# Patient Record
Sex: MALE | Race: WHITE | Employment: FULL TIME | URBAN - METROPOLITAN AREA
[De-identification: names, ages, dates, MRNs, and addresses within clinical notes are randomized per-mention and may not be internally consistent; named-entity substitution may affect disease eponyms.]

---

## 2020-08-07 ENCOUNTER — HOSPITAL ENCOUNTER (OUTPATIENT)
Dept: MRI IMAGING | Age: 55
Discharge: HOME | End: 2020-08-09
Payer: COMMERCIAL

## 2020-08-07 PROCEDURE — 73221 MRI JOINT UPR EXTREM W/O DYE: CPT

## 2020-12-31 ENCOUNTER — HOSPITAL ENCOUNTER (OUTPATIENT)
Dept: MRI IMAGING | Age: 55
Discharge: HOME | End: 2021-01-02
Payer: COMMERCIAL

## 2020-12-31 DIAGNOSIS — M25.511 ACUTE PAIN OF RIGHT SHOULDER: ICD-10-CM

## 2020-12-31 PROCEDURE — 73221 MRI JOINT UPR EXTREM W/O DYE: CPT

## 2024-06-05 ENCOUNTER — OFFICE VISIT (OUTPATIENT)
Age: 59
End: 2024-06-05
Payer: COMMERCIAL

## 2024-06-05 DIAGNOSIS — R10.84 GENERALIZED ABDOMINAL PAIN: Primary | ICD-10-CM

## 2024-06-05 DIAGNOSIS — M62.838 MUSCLE SPASM: ICD-10-CM

## 2024-06-05 DIAGNOSIS — R35.0 FREQUENCY OF URINATION: ICD-10-CM

## 2024-06-05 PROCEDURE — 97161 PT EVAL LOW COMPLEX 20 MIN: CPT | Performed by: PHYSICAL THERAPIST

## 2024-06-05 PROCEDURE — 97140 MANUAL THERAPY 1/> REGIONS: CPT | Performed by: PHYSICAL THERAPIST

## 2024-06-05 NOTE — PROGRESS NOTES
Physical Therapy Evaluation  [facility], [facility] UROGYNECOLOGY AND PELVIC REHABILITATION   [address] [place] [address]  [address] [place] [address]  Dept: [phone]     Patient:  Swapnil De La Garza  : 1965    Visit Date:  2024   Referring Provider:  MD Dr. Harsh Kramer is available in clinic as incident to provider.    Time In: 08:54  Time Out: 09:24   Total time: 30 min  Total timed codes treatment: 30 min  Total 1:1 time: 30 min    Treatment:  Treatment Charges Mins Units   [x] Manual Therapy (42837) 15 1   [] Therapeutic Activity (69842)     [] Self Care/Home Management Training (23500)     [] Therapeutic Exercise (20345)     [] Neuromuscular Jevon (60242)     [] Gait Training (36339)     [x] PT-Eval (62268, 05449, 58736) 15 1   [] PT Re-Eval (44119)     [] Caregiver Training (29551)     Total Treatment Time: 30 2      Diagnoses:    Diagnosis Orders   1. Generalized abdominal pain        2. Muscle spasm        3. Frequency of urination             Precautions:  BPH. LBP  Swapnil De La Garza, 59 y.o., male presents today for evaluation.    Patient referred by Kevin Del Rosario MD  with pain/tightness in the B lower abdomen, R>L, frequency of urination, occ burning with urination.  .    Pt's concerns are for increased tension and pain in abdomen at least several times a month that can last 3-4 days at a time. Frequency of urination is every  minutes with feeling that he does not completely empty his bladder at least 1-2 timea day when his symptoms are increased. He feels past medicupping PT sessions and stretching were very beneficial for reducing his symptoms. He also felt it helped with urinary frequency issues and the occ burning he has near tip of penis when urinating. He reports an increase in symptoms due to increased stress at home over the last few months.He is now seeing a counselor to help with stress control- he feels his symptoms flare when

## 2024-06-14 ENCOUNTER — TREATMENT (OUTPATIENT)
Age: 59
End: 2024-06-14

## 2024-06-14 DIAGNOSIS — R39.15 URGENCY OF URINATION: ICD-10-CM

## 2024-06-14 DIAGNOSIS — R10.84 ABDOMINAL PAIN, GENERALIZED: Primary | ICD-10-CM

## 2024-06-14 NOTE — PROGRESS NOTES
[facility] UROGYNECOLOGY AND PELVIC REHABILITATION   [address] [place] [address]  Dept: [phone]     Massage  Date:  2024    Patient Name:  Swapnil De La Garza                                                                 :  1965                                                                                Gender:   male                                                                             MRN: [id number]                                                                                 Visit #:  n/a  Injury Date:  n/a  Evaluation Date: n/a  Onset Date:  n/a    Provider:  Ruth Kaur PTA   Therapist of Record/Supervising: Tigist Mayorga                                                   Referring Provider: No ref. provider found                            Medical Diagnoses:    Diagnosis Orders   1. Abdominal pain, generalized        2. Urgency of urination                                                                                    Patient Status:  Pt is present with increased abdominal pain and burn with urination      Provider Interactions with Patient During Visit:  Self pay massage      treatment  [id number]    24 11:04 AM EDT review of systems completed     Treatment Time:    Time In:11:00am            Time Out: 11:30am    Electronically signed by:  Ruth Kaur PTA

## 2024-06-28 ENCOUNTER — EVALUATION (OUTPATIENT)
Age: 59
End: 2024-06-28
Payer: COMMERCIAL

## 2024-06-28 DIAGNOSIS — R35.0 FREQUENCY OF URINATION: ICD-10-CM

## 2024-06-28 DIAGNOSIS — M62.838 MUSCLE SPASM: ICD-10-CM

## 2024-06-28 DIAGNOSIS — R10.84 GENERALIZED ABDOMINAL PAIN: ICD-10-CM

## 2024-06-28 DIAGNOSIS — R39.15 URGENCY OF URINATION: ICD-10-CM

## 2024-06-28 DIAGNOSIS — R10.84 ABDOMINAL PAIN, GENERALIZED: Primary | ICD-10-CM

## 2024-06-28 PROCEDURE — 97140 MANUAL THERAPY 1/> REGIONS: CPT | Performed by: PHYSICAL THERAPIST

## 2024-06-28 PROCEDURE — 97530 THERAPEUTIC ACTIVITIES: CPT | Performed by: PHYSICAL THERAPIST

## 2024-06-28 NOTE — PROGRESS NOTES
stretching.   Pt to demonstrate correction of urinary voiding dysfunction, as demonstrated by maintenance of relaxed pelvic floor during urination, daily routine     Physical therapy Long Term goals to be achieved in 6  Weeks (6 visit):     Pt to report normalized voiding frequency: daytime intervals of 2-3 hours and 0 time per night for a less bladder pressure during the day and  more restful sleep.           Patient education - patient will demonstrate verbal understanding of normal function of pelvic floor musculature, bowel/bladder habits and appropriate dietary and behavioral modifications.  Patient will be independent in home program for relaxation focused strategies, appropriate stretches and exercises all for purpose of good self management of pain .  Patient to demonstrate low emg baseline prn and decreased feeling of incomplete bladder emptying to correlate with proper relaxed voiding and resolution of pain during evacuation of bladder.  Patient reports decrease of pain by 80- 100% through resolution of pelvic malalignment and instabilities, normalization of spinal mobility/flexibility, resolution of both internal and external muscle spasm and trigger points, and improved motor control and strength of pelvic musculature.   Patient will return to functional ADL's with decreased or no pain, including exercise, relaxation breathing  Physical Therapy Plan of Treatment (may include)     Therapeutic exercise  Neuromotor re-education  Manual therapy  Progressive PME's   Purchase of PF Stimulator or TENS/IF unit for home use  Patient education and home program training  Biofeedback training  medi- cupping/MFR     PLAN- medicupping, hip flex and LB stretch, MFR prn- external pelvic floor/pectinueus        Treatment Charges: Mins Units   []  Modalities     []  Ther Exercise     []  Manual Therapy 30 2   []  Ther Activities 8 1   []  Aquatics     []  Vasocompression     []  Other     Total Treatment time 38 3

## 2024-07-16 ENCOUNTER — EVALUATION (OUTPATIENT)
Age: 59
End: 2024-07-16
Payer: COMMERCIAL

## 2024-07-16 DIAGNOSIS — R39.15 URGENCY OF URINATION: ICD-10-CM

## 2024-07-16 DIAGNOSIS — R10.84 ABDOMINAL PAIN, GENERALIZED: Primary | ICD-10-CM

## 2024-07-16 PROCEDURE — 97140 MANUAL THERAPY 1/> REGIONS: CPT | Performed by: PHYSICAL THERAPIST

## 2024-07-16 PROCEDURE — 97530 THERAPEUTIC ACTIVITIES: CPT | Performed by: PHYSICAL THERAPIST

## 2024-07-16 NOTE — PROGRESS NOTES
[facility], [facility] UROGYNECOLOGY AND PELVIC REHABILITATION   [address] [place] [address]  [address] [place] [address]  Dept: [phone]     Date of Visit: 2024   Patient: Swapnil De La Garza   : 1965   Referring Physician: Kevin Del Rosario MD  Insurance: [facility]    Visit#:  3  Visit Diagnoses:      Diagnosis Orders   1. Abdominal pain, generalized        2. Urgency of urination                   Subjective:  Swapnil De La Garza  (: 1965  is a 59 y.o.  y.o. male ,Established patient.  Pt states he has not had any burning in the the past week some increased abdominal tightness and slight increase in frequency was standing for over 4 hours at a party for his dad.  Pt got a testosterone injection last week.      Objective:   Pt had moderate tightness and tension noted bilateral upper and lower abdomen and UGT    Treatment:  Manual Therapy:  30 MIN , MFR/Cupping to bilateral upper and lower abdomen   There-Act:  10 MIN  Reviewed POC and HEP.  Pt aware of after care and possible side effects of Cupping reviewed bladder irritants     Assessment:  Pt had moderate tightness noted across bilateral upper and lower abdomen decreased after MFR/cupping/  Pt is aware of after care and possible side effects of cupping.  Pt will continue to benefit from skilled PT to increase mobility and decrease pain and frequency    Physical Therapy Short Term Goals to be achieved in 4 weeks:        Pt will demonstrate verbal understanding of good dietary habits including adequate fluid intake, dietary considerations for good bladder and bowel habits  progressing  Pt will be independent with HEP for pain control, relaxation, and therapeutic stretching. progressing  Pt to demonstrate correction of urinary voiding dysfunction, as demonstrated by maintenance of relaxed pelvic floor during urination, daily routine progressing     Physical therapy Long Term goals to be achieved in 6  Weeks (6

## 2024-07-24 NOTE — PROGRESS NOTES
[facility], [facility] UROGYNECOLOGY AND PELVIC REHABILITATION   [address] [place] [address]  [address] [place] [address]  Dept: [phone]     Date of Visit: 2024   Patient: Swapnil De La Garza   : 1965   Referring Physician: Kevin Del Rosario MD  Insurance: [facility]    Visit#:  5  Visit Diagnoses:        Diagnosis Orders   1. Abdominal pain, generalized        2. Urgency of urination        3. Generalized abdominal pain        4. Muscle spasm        5. Frequency of urination                   Subjective:  Swapnil De La Garza  (: 1965  is a 59 y.o.  y.o. male ,Established patient.   Pt states increased burning in his abdomen this past week with tightness/cramping however states he feels stress brings this on he had to take his wife to the hospital again and she was in for 3 days )wife has cirrosis of the liver waiting for appt with [facility] to be on transplant list)       Objective:   Pt had moderate tightness and tension noted bilateral upper and lower abdomen and UGT    Treatment:  Manual Therapy:  35 MIN , MFR/Cupping to bilateral upper and lower abdomen   There-Act:  10 MIN  Reviewed POC and HEP.  Pt aware of after care and possible side effects of Cupping reviewed bladder irritants     Assessment:  Pt had moderate tightness noted across bilateral upper and lower abdomen decreased after MFR/cupping/  Pt is aware of after care and possible side effects of cupping.  Pt will continue to benefit from skilled PT to increase mobility and decrease pain and frequency    Physical Therapy Short Term Goals to be achieved in 4 weeks:        Pt will demonstrate verbal understanding of good dietary habits including adequate fluid intake, dietary considerations for good bladder and bowel habits  progressing  Pt will be independent with HEP for pain control, relaxation, and therapeutic stretching. progressing  Pt to demonstrate correction of urinary voiding

## 2024-07-25 ENCOUNTER — EVALUATION (OUTPATIENT)
Age: 59
End: 2024-07-25
Payer: COMMERCIAL

## 2024-07-25 DIAGNOSIS — R39.15 URGENCY OF URINATION: ICD-10-CM

## 2024-07-25 DIAGNOSIS — R10.84 ABDOMINAL PAIN, GENERALIZED: Primary | ICD-10-CM

## 2024-07-25 DIAGNOSIS — M62.838 MUSCLE SPASM: ICD-10-CM

## 2024-07-25 DIAGNOSIS — R10.84 GENERALIZED ABDOMINAL PAIN: ICD-10-CM

## 2024-07-25 DIAGNOSIS — R35.0 FREQUENCY OF URINATION: ICD-10-CM

## 2024-07-25 PROCEDURE — 97140 MANUAL THERAPY 1/> REGIONS: CPT | Performed by: PHYSICAL THERAPIST

## 2024-07-25 PROCEDURE — 97530 THERAPEUTIC ACTIVITIES: CPT | Performed by: PHYSICAL THERAPIST

## 2024-10-11 ENCOUNTER — OFFICE VISIT (OUTPATIENT)
Age: 59
End: 2024-10-11

## 2024-10-11 DIAGNOSIS — R35.0 FREQUENCY OF URINATION: ICD-10-CM

## 2024-10-11 DIAGNOSIS — M54.50 CHRONIC MIDLINE LOW BACK PAIN WITHOUT SCIATICA: ICD-10-CM

## 2024-10-11 DIAGNOSIS — M62.838 MUSCLE SPASM: ICD-10-CM

## 2024-10-11 DIAGNOSIS — R10.84 GENERALIZED ABDOMINAL PAIN: Primary | ICD-10-CM

## 2024-10-11 DIAGNOSIS — G89.29 CHRONIC MIDLINE LOW BACK PAIN WITHOUT SCIATICA: ICD-10-CM

## 2024-10-11 DIAGNOSIS — R39.15 URGENCY OF URINATION: ICD-10-CM

## 2024-10-11 NOTE — PROGRESS NOTES
[facility] UROGYNECOLOGY AND PELVIC REHABILITATION   [address] [place] [address]  [address] [place] [address]     Physical Therapy Pelvic Floor Evaluation    Date:  10/11/2024  Patient: Swapnil De La Garza  : 1965  MRN: [id number]  Referring Physician:  Dr Josesito Wilburn     Insurance: [facility]  Diagnosis Codes:   Encounter Diagnoses   Name Primary?    Generalized abdominal pain Yes    Urgency of urination     Frequency of urination     Chronic midline low back pain without sciatica     Muscle spasm         Visit #     Onset Date: chronic                                  Next 's appt: unknown    Subjective:   CC/HPI:Pt is a 59 y.o. yo male known to PT  who was unable to come in for PT related to wife being in [facility]  with his wife who had a life saving procedure performed..    Pt reports abdominal pain was increased in abdomen and numbness into genitals when he was unable to come into PT  Stress makes it bad and sometimes can't stand up straight.  Pt has some return of the burning with urination over the past 3 days.    Discussed bladder ease  Discussed pt starting the FloMax that was recommended as pt has enlarged prostate (contributing to urgency and frequency)  (but not starting both the supplement and medication at the same time)    Pt  can void 1-2xhour in the morning and ever 2 hours in the afternoon.   Voids 1-2 times per  night      Pt also reports increased low back pain recently.    Pt's goal is to have improved void frequency with less abdominal/bladder pain and less LBP.           Objective:    Decreased knowledge about relationship between FloMax and Bladder Ease on urinary flow and bladder pain symptoms.    Abdominal tension: Sig tension central lower abdomen    Lower abdominal assessment to be performed at future PT visit.     Assessment: Pt is a 60 y/o male returning to PT to resume treatment after being on hold for

## 2024-10-30 ENCOUNTER — EVALUATION (OUTPATIENT)
Age: 59
End: 2024-10-30

## 2024-10-30 DIAGNOSIS — M54.50 CHRONIC MIDLINE LOW BACK PAIN WITHOUT SCIATICA: ICD-10-CM

## 2024-10-30 DIAGNOSIS — R10.84 GENERALIZED ABDOMINAL PAIN: Primary | ICD-10-CM

## 2024-10-30 DIAGNOSIS — R35.0 FREQUENCY OF URINATION: ICD-10-CM

## 2024-10-30 DIAGNOSIS — R39.15 URGENCY OF URINATION: ICD-10-CM

## 2024-10-30 DIAGNOSIS — R10.84 ABDOMINAL PAIN, GENERALIZED: ICD-10-CM

## 2024-10-30 DIAGNOSIS — M62.838 MUSCLE SPASM: ICD-10-CM

## 2024-10-30 DIAGNOSIS — G89.29 CHRONIC MIDLINE LOW BACK PAIN WITHOUT SCIATICA: ICD-10-CM

## 2024-10-30 NOTE — PROGRESS NOTES
[facility], [facility] UROGYNECOLOGY AND PELVIC REHABILITATION   [address] [place] [address]  [address] [place] [address]  Dept: [phone]     Date of Visit: 10/30/2024   Patient: Swapnil De La Garza   : 1965   Referring Physician: Kevin Del Rosario MD  Insurance: [facility]    Visit#:  2  Visit Diagnoses:            Diagnosis Orders   1. Generalized abdominal pain        2. Urgency of urination        3. Frequency of urination        4. Chronic midline low back pain without sciatica        5. Muscle spasm        6. Abdominal pain, generalized                   Subjective:  Swapnil De La Garza  (: 1965  is a 59 y.o.  y.o. male ,  Pt states increased burning in his abdomen this past week with tightness/cramping as well as increased LBP/sciatica in right leg.  Wife is mending at home now and needs less care from him       Objective:   Pt had moderate tightness and tension noted bilateral upper and lower abdomen and UGT, bilateral LB/buttocks    Treatment:  Manual Therapy:  45 MIN , MFR/Cupping to bilateral upper and lower abdomen bilateral low back and buttocks region   There-Act:  10 MIN  Reviewed POC and HEP.  Pt aware of after care and possible side effects of Cupping reviewed bladder irritants     Assessment:  Pt had moderate tightness noted across bilateral upper and lower abdomen  and bilateral LB/buttocks region decreased after MFR/cupping/  Pt is aware of after care and possible side effects of cupping.  Pt will continue to benefit from skilled PT to increase mobility and decrease pain and frequency    Physical Therapy Short Term Goals to be achieved in 4 weeks:        Pt will demonstrate verbal understanding of good dietary habits including adequate fluid intake, dietary considerations for good bladder and bowel habits  progressing  Pt will be independent with HEP for pain control, relaxation, and therapeutic stretching. progressing  Pt to demonstrate correction

## 2024-11-05 ENCOUNTER — EVALUATION (OUTPATIENT)
Age: 59
End: 2024-11-05
Payer: COMMERCIAL

## 2024-11-05 DIAGNOSIS — R10.84 ABDOMINAL PAIN, GENERALIZED: ICD-10-CM

## 2024-11-05 DIAGNOSIS — M54.50 CHRONIC MIDLINE LOW BACK PAIN WITHOUT SCIATICA: ICD-10-CM

## 2024-11-05 DIAGNOSIS — G89.29 CHRONIC MIDLINE LOW BACK PAIN WITHOUT SCIATICA: ICD-10-CM

## 2024-11-05 DIAGNOSIS — R10.84 GENERALIZED ABDOMINAL PAIN: Primary | ICD-10-CM

## 2024-11-05 DIAGNOSIS — M62.838 MUSCLE SPASM: ICD-10-CM

## 2024-11-05 DIAGNOSIS — R39.15 URGENCY OF URINATION: ICD-10-CM

## 2024-11-05 DIAGNOSIS — R35.0 FREQUENCY OF URINATION: ICD-10-CM

## 2024-11-05 PROCEDURE — 97140 MANUAL THERAPY 1/> REGIONS: CPT | Performed by: PHYSICAL THERAPIST

## 2024-11-05 PROCEDURE — 97530 THERAPEUTIC ACTIVITIES: CPT | Performed by: PHYSICAL THERAPIST

## 2024-11-05 NOTE — PROGRESS NOTES
pelvic floor during urination, daily routine progressing     Physical therapy Long Term goals to be achieved in 6  Weeks (6 visit):  all goal are progressing     Pt to report normalized voiding frequency: daytime intervals of 2-3 hours and 0 time per night for a less bladder pressure during the day and  more restful sleep.           Patient education - patient will demonstrate verbal understanding of normal function of pelvic floor musculature, bowel/bladder habits and appropriate dietary and behavioral modifications.  Patient will be independent in home program for relaxation focused strategies, appropriate stretches and exercises all for purpose of good self management of pain .  Patient to demonstrate low emg baseline prn and decreased feeling of incomplete bladder emptying to correlate with proper relaxed voiding and resolution of pain during evacuation of bladder.  Patient reports decrease of pain by 80- 100% through resolution of pelvic malalignment and instabilities, normalization of spinal mobility/flexibility, resolution of both internal and external muscle spasm and trigger points, and improved motor control and strength of pelvic musculature.   Patient will return to functional ADL's with decreased or no pain, including exercise, relaxation breathing  Physical Therapy Plan of Treatment (may include)     Therapeutic exercise  Neuromotor re-education  Manual therapy  Progressive PME's   Purchase of PF Stimulator or TENS/IF unit for home use  Patient education and home program training  Biofeedback training  medi- cupping/MFR     PLAN- medicupping, hip flex and LB stretch, MFR prn- external pelvic floor/pectinueus        Treatment Charges: Mins Units   []  Modalities     []  Ther Exercise     []  Manual Therapy 30 2   []  Ther Activities 10 1   []  Aquatics     []  Vasocompression     []  Other     Total Treatment time 40 3         Time In: 2:30pm         Time Out: 3:10pm  Electronically signed by Ruth GRANT

## 2024-11-20 NOTE — PROGRESS NOTES
[facility], [facility] UROGYNECOLOGY AND PELVIC REHABILITATION   [address] [place] [address]  [address] [place] [address]  Dept: [phone]     Date of Visit: 2024   Patient: Swapnil De La Garza   : 1965   Referring Physician: Kevin Del Rosario MD  Insurance: [facility]    Visit#:  4  Visit Diagnoses:      Diagnosis Orders   1. Generalized abdominal pain        2. Urgency of urination        3. Frequency of urination        4. Chronic midline low back pain without sciatica        5. Muscle spasm        6. Abdominal pain, generalized            Subjective:  Swapnil De La Garza  (: 1965  is a 59 y.o.  y.o. male ,  Pt states LB and abdomen felt good after last treatment and pain and tightness just started creeping back in but has been at [facility] with his wife again still struggling keeping potassium and sodium counts up.      Objective:   Pt had moderate tightness and tension noted bilateral upper and lower abdomen and UGT,     Treatment:  Manual Therapy:  15 MIN , MFR/Cupping to bilateral upper and lower abdomen bilateral low back and buttocks region   There-Act:  15 MIN  Reviewed POC and HEP.  Pt aware of after care and possible side effects of Cupping reviewed bladder irritants     Assessment:  Pt had moderate tightness noted across bilateral upper and lower abdomen   decreased after MFR/cupping.   Pt is aware of after care and possible side effects of cupping.  Pt will continue to benefit from skilled PT to increase mobility and decrease pain and frequency    Physical Therapy Short Term Goals to be achieved in 4 weeks:        Pt will demonstrate verbal understanding of good dietary habits including adequate fluid intake, dietary considerations for good bladder and bowel habits  progressing  Pt will be independent with HEP for pain control, relaxation, and therapeutic stretching. progressing  Pt to demonstrate correction of urinary voiding dysfunction, as demonstrated

## 2024-11-21 ENCOUNTER — EVALUATION (OUTPATIENT)
Age: 59
End: 2024-11-21

## 2024-11-21 DIAGNOSIS — M54.50 CHRONIC MIDLINE LOW BACK PAIN WITHOUT SCIATICA: ICD-10-CM

## 2024-11-21 DIAGNOSIS — R10.84 ABDOMINAL PAIN, GENERALIZED: ICD-10-CM

## 2024-11-21 DIAGNOSIS — G89.29 CHRONIC MIDLINE LOW BACK PAIN WITHOUT SCIATICA: ICD-10-CM

## 2024-11-21 DIAGNOSIS — R39.15 URGENCY OF URINATION: ICD-10-CM

## 2024-11-21 DIAGNOSIS — M62.838 MUSCLE SPASM: ICD-10-CM

## 2024-11-21 DIAGNOSIS — R35.0 FREQUENCY OF URINATION: ICD-10-CM

## 2024-11-21 DIAGNOSIS — R10.84 GENERALIZED ABDOMINAL PAIN: Primary | ICD-10-CM

## 2024-12-13 ENCOUNTER — EVALUATION (OUTPATIENT)
Age: 59
End: 2024-12-13

## 2024-12-13 DIAGNOSIS — G89.29 CHRONIC MIDLINE LOW BACK PAIN WITHOUT SCIATICA: ICD-10-CM

## 2024-12-13 DIAGNOSIS — M54.50 CHRONIC MIDLINE LOW BACK PAIN WITHOUT SCIATICA: ICD-10-CM

## 2024-12-13 DIAGNOSIS — R10.84 GENERALIZED ABDOMINAL PAIN: Primary | ICD-10-CM

## 2024-12-13 DIAGNOSIS — R35.0 FREQUENCY OF URINATION: ICD-10-CM

## 2024-12-13 DIAGNOSIS — R10.84 ABDOMINAL PAIN, GENERALIZED: ICD-10-CM

## 2024-12-13 DIAGNOSIS — M62.838 MUSCLE SPASM: ICD-10-CM

## 2024-12-13 DIAGNOSIS — R39.15 URGENCY OF URINATION: ICD-10-CM

## 2024-12-13 NOTE — PROGRESS NOTES
HEP for pain control, relaxation, and therapeutic stretching. progressing  Pt to demonstrate correction of urinary voiding dysfunction, as demonstrated by maintenance of relaxed pelvic floor during urination, daily routine progressing     Physical therapy Long Term goals to be achieved in 6  Weeks (6 visit):  all goal are progressing     Pt to report normalized voiding frequency: daytime intervals of 2-3 hours and 0 time per night for a less bladder pressure during the day and  more restful sleep.           Patient education - patient will demonstrate verbal understanding of normal function of pelvic floor musculature, bowel/bladder habits and appropriate dietary and behavioral modifications.  Patient will be independent in home program for relaxation focused strategies, appropriate stretches and exercises all for purpose of good self management of pain .  Patient to demonstrate low emg baseline prn and decreased feeling of incomplete bladder emptying to correlate with proper relaxed voiding and resolution of pain during evacuation of bladder.  Patient reports decrease of pain by 80- 100% through resolution of pelvic malalignment and instabilities, normalization of spinal mobility/flexibility, resolution of both internal and external muscle spasm and trigger points, and improved motor control and strength of pelvic musculature.   Patient will return to functional ADL's with decreased or no pain, including exercise, relaxation breathing  Physical Therapy Plan of Treatment (may include)     Therapeutic exercise  Neuromotor re-education  Manual therapy  Progressive PME's   Purchase of PF Stimulator or TENS/IF unit for home use  Patient education and home program training  Biofeedback training  medi- cupping/MFR     PLAN- medicupping, hip flex and LB stretch, MFR prn- external pelvic floor/pectinueus        Treatment Charges: Mins Units   []  Modalities     []  Ther Exercise     [x]  Manual Therapy 30 2   [x]  Ther

## 2025-01-10 ENCOUNTER — EVALUATION (OUTPATIENT)
Age: 60
End: 2025-01-10

## 2025-01-10 DIAGNOSIS — G89.29 CHRONIC MIDLINE LOW BACK PAIN WITHOUT SCIATICA: ICD-10-CM

## 2025-01-10 DIAGNOSIS — R39.15 URGENCY OF URINATION: ICD-10-CM

## 2025-01-10 DIAGNOSIS — R35.0 FREQUENCY OF URINATION: ICD-10-CM

## 2025-01-10 DIAGNOSIS — M62.838 MUSCLE SPASM: ICD-10-CM

## 2025-01-10 DIAGNOSIS — R10.84 ABDOMINAL PAIN, GENERALIZED: ICD-10-CM

## 2025-01-10 DIAGNOSIS — M54.50 CHRONIC MIDLINE LOW BACK PAIN WITHOUT SCIATICA: ICD-10-CM

## 2025-01-10 DIAGNOSIS — R10.84 GENERALIZED ABDOMINAL PAIN: Primary | ICD-10-CM

## 2025-01-10 NOTE — PROGRESS NOTES
[facility], [facility] UROGYNECOLOGY AND PELVIC REHABILITATION   [address] [place] [address]  [address] [place] [address]  Dept: [phone]     Date of Visit: 1/10/2025   Patient: Swapnil De La Garza   : 1965   Referring Physician: Kevin Del Rosario MD  Insurance: [facility]    Visit#:  6  Visit Diagnoses:        Diagnosis Orders   1. Generalized abdominal pain        2. Urgency of urination        3. Frequency of urination        4. Chronic midline low back pain without sciatica        5. Muscle spasm        6. Abdominal pain, generalized                  Subjective:  Swapnil De La Garza  (: 1965  is a 59 y.o.  y.o. male ,  Pt states LB and abdomen felt good after last treatment and pain and tightness just started creeping back in Pt states he has started his stretches back up and that has helped as well.  Pt states increased LB and abdominal pain possibly due to weather change with occasional symptoms down his leg.  Pt is seeing Dr Elder in February for his back    Objective:   Pt had moderate tightness and tension noted bilateral upper and lower abdomen and UGT, bilateral LB/buttocks    Treatment:  Manual Therapy:  30 MIN , MFR/Cupping to bilateral upper and lower abdomen bilateral low back and buttocks region   There-Act:  15 MIN  Reviewed POC and HEP.  Pt aware of after care and possible side effects of Cupping added prone prop     Assessment:  Pt had moderate tightness noted across bilateral upper and lower abdomen, bilateral LB/Buttocks decreased after MFR/cupping.   Pt is aware of after care and possible side effects of cupping.  Pt will continue to benefit from skilled PT to increase mobility and decrease pain and frequency    Aftercare instructions:  any of these symptoms may last for 24-48 hours after treatment  No hot pack or cold pack for 6 hours anywhere on the body   If you are light headed and/or nauseas or get a headache. Drink more water or electrolyte

## 2025-01-29 NOTE — PROGRESS NOTES
on the body   If you are light headed and/or nauseas or get a headache. Drink more water or electrolyte solution (ex water diluted Gatorade, LMNT, liquid IV) as we drain everything into your lymphatic system which can caused increased frequency with urination or increased sweating. It is not uncommon to see people significantly increase fluid intake and we recommend meeting your bodies demands. This is a NORMAL response.   If you are achy and you can take tylenol, motrin or ibuprofen if needed we are unable to prescribed it but will not interfere with treatment.  For females: you may see some spotting especially if you are close or at the end of your cycle. This is normal. Drink more water.       Physical Therapy Short Term Goals to be achieved in 4 weeks:        Pt will demonstrate verbal understanding of good dietary habits including adequate fluid intake, dietary considerations for good bladder and bowel habits  GOAL MET 01/10/25  Pt will be independent with HEP for pain control, relaxation, and therapeutic stretching. GOAL MET 01/30/25  Pt to demonstrate correction of urinary voiding dysfunction, as demonstrated by maintenance of relaxed pelvic floor during urination, daily routine GOAL MET  01/10/25     Physical therapy Long Term goals to be achieved in 6  Weeks (6 visit):       Pt to report normalized voiding frequency: daytime intervals of 2-3 hours and 0 time per night for a less bladder pressure during the day and  more restful sleep.       GOAL PARTIALLY MET    Patient education - patient will demonstrate verbal understanding of normal function of pelvic floor musculature, bowel/bladder habits and appropriate dietary and behavioral modifications.  GOAL MET  Patient will be independent in home program for relaxation focused strategies, appropriate stretches and exercises all for purpose of good self management of pain .  GOAL  MET  Patient to demonstrate low emg baseline prn and decreased feeling of

## 2025-01-30 ENCOUNTER — EVALUATION (OUTPATIENT)
Age: 60
End: 2025-01-30

## 2025-01-30 DIAGNOSIS — R10.84 GENERALIZED ABDOMINAL PAIN: Primary | ICD-10-CM

## 2025-01-30 DIAGNOSIS — R39.15 URGENCY OF URINATION: ICD-10-CM

## 2025-01-30 DIAGNOSIS — R35.0 FREQUENCY OF URINATION: ICD-10-CM

## 2025-01-30 DIAGNOSIS — M54.50 CHRONIC MIDLINE LOW BACK PAIN WITHOUT SCIATICA: ICD-10-CM

## 2025-01-30 DIAGNOSIS — G89.29 CHRONIC MIDLINE LOW BACK PAIN WITHOUT SCIATICA: ICD-10-CM

## 2025-01-30 DIAGNOSIS — R10.84 ABDOMINAL PAIN, GENERALIZED: ICD-10-CM

## 2025-01-30 DIAGNOSIS — M62.838 MUSCLE SPASM: ICD-10-CM

## 2025-02-19 NOTE — PROGRESS NOTES
[facility] UROGYNECOLOGY AND PELVIC REHABILITATION   [address] [place] [address]  Dept: [phone]     Massage  Date:  2025    Patient Name:  Swapnil De La Garza                                                                 :  1965                                                                                Gender:   male                                                                             MRN: [id number]                                                                                 Visit #:  n/a  Evaluation Date: n/a      Provider:  Ruth Kaur PTA   Therapist of Record/Supervising: Radha Mcneill                                                   Referring Provider:     Kevin Del Rosario MD                       Medical Diagnoses:                          Visit Diagnoses         Codes    Generalized abdominal pain    -  Primary R10.84    Chronic midline low back pain without sciatica     M54.50, G89.29                                                        Patient Status:  Pt is present with bilateral LB during today's session.    Provider Interactions with Patient During Visit:    [id number]  25 11:45 AM EST review of systems completed     Treatment Time:    Time In:11:10am            Time Out: 11:45    Electronically signed by:  Ruth Kaur PTA

## 2025-02-20 ENCOUNTER — TREATMENT (OUTPATIENT)
Age: 60
End: 2025-02-20

## 2025-02-20 DIAGNOSIS — M54.50 CHRONIC MIDLINE LOW BACK PAIN WITHOUT SCIATICA: ICD-10-CM

## 2025-02-20 DIAGNOSIS — R10.84 GENERALIZED ABDOMINAL PAIN: Primary | ICD-10-CM

## 2025-02-20 DIAGNOSIS — G89.29 CHRONIC MIDLINE LOW BACK PAIN WITHOUT SCIATICA: ICD-10-CM

## 2025-03-10 ENCOUNTER — TREATMENT (OUTPATIENT)
Age: 60
End: 2025-03-10

## 2025-03-10 DIAGNOSIS — M54.50 CHRONIC MIDLINE LOW BACK PAIN WITHOUT SCIATICA: ICD-10-CM

## 2025-03-10 DIAGNOSIS — G89.29 CHRONIC MIDLINE LOW BACK PAIN WITHOUT SCIATICA: ICD-10-CM

## 2025-03-10 DIAGNOSIS — R10.84 GENERALIZED ABDOMINAL PAIN: Primary | ICD-10-CM

## 2025-03-10 PROCEDURE — MISCPFT30 PFT THERAPEUTIC MASSAGE - 30 MIN: Performed by: PHYSICAL THERAPIST

## 2025-03-10 NOTE — PROGRESS NOTES
[facility] UROGYNECOLOGY AND PELVIC REHABILITATION   [address] [place] [address]  Dept: [phone]     Massage  Date:  3/10/2025    Patient Name:  Swapnil De La Garza                                                                 :  1965                                                                                Gender:   male                                                                             MRN: [id number]                                                                                 Visit #:  n/a  Evaluation Date: n/a      Provider:  Ruth Kaur PTA   Therapist of Record/Supervising: Radha Mcneill                                                   Referring Provider:     Kevin Del Rosario MD                       Medical Diagnoses:                            Visit Diagnoses         Codes      Generalized abdominal pain    -  Primary R10.84      Chronic midline low back pain without sciatica     M54.50, G89.29          Patient Status:  Pt is present with bilateral LB during today's session.    Provider Interactions with Patient During Visit:    [id number]  25 11:45 AM EST review of systems completed     Treatment Time:    Time In:12:30pm            Time Out: 1:00pm    Electronically signed by:  Ruth Kaur PTA

## 2025-04-03 NOTE — PROGRESS NOTES
[facility] UROGYNECOLOGY AND PELVIC REHABILITATION  [address]  [address]  [place] [id number]  Dept: [phone]     Massage  Date:  4/3/2025    Patient Name:  Swapnil De La Garza                                                                 :  1965                                                                                Gender:   male                                                                             MRN: [id number]                                                                                 Visit #:  n/a  Evaluation Date: n/a      Provider:  Ruth Kaur PTA   Therapist of Record/Supervising: Radha Mcneill                                                   Referring Provider:     Kevin Del Rosario MD                       Medical Diagnoses:                              Visit Diagnoses         Codes      Abdominal pain, generalized    -  Primary R10.84      Bilateral low back pain, unspecified chronicity, unspecified whether sciatica present     M54.50              Patient Status:  Pt is present with bilateral lower abdominal pain during today's session.    Provider Interactions with Patient During Visit:    [id number]  25 11:45 AM EST review of systems completed     Treatment Time:    Time In:11:00am            Time Out: 11:38am    Electronically signed by:  Ruth Kaur PTA

## 2025-04-04 ENCOUNTER — TREATMENT (OUTPATIENT)
Age: 60
End: 2025-04-04

## 2025-04-04 DIAGNOSIS — M54.50 BILATERAL LOW BACK PAIN, UNSPECIFIED CHRONICITY, UNSPECIFIED WHETHER SCIATICA PRESENT: ICD-10-CM

## 2025-04-04 DIAGNOSIS — R10.84 ABDOMINAL PAIN, GENERALIZED: Primary | ICD-10-CM

## 2025-04-24 NOTE — PROGRESS NOTES
[facility] UROGYNECOLOGY AND PELVIC REHABILITATION  [address]  [address]  [place] [id number]  Dept: [phone]     Massage  Date:  2025    Patient Name:  Swapnil De La Garza                                                                 :  1965                                                                                Gender:   male                                                                             MRN: [id number]                                                                                 Visit #:  n/a  Evaluation Date: n/a      Provider:  Ruth Kaur PTA   Therapist of Record/Supervising: Radha Mcneill                                                   Referring Provider:     Kevin Del Rosario MD                       Medical Diagnoses:                                Visit Diagnoses         Codes      Abdominal pain, generalized    -  Primary R10.84      Bilateral low back pain, unspecified chronicity, unspecified whether sciatica present     M54.50                  Patient Status:  Pt is present with bilateral lower abdominal pain during today's session.    Provider Interactions with Patient During Visit:    [id number]  25 11:45 AM EST review of systems completed     Treatment Time:    Time In:10:45am            Time Out: 11:20am    Electronically signed by:  Ruth Kaur PTA

## 2025-04-25 ENCOUNTER — TREATMENT (OUTPATIENT)
Age: 60
End: 2025-04-25

## 2025-04-25 DIAGNOSIS — R10.84 ABDOMINAL PAIN, GENERALIZED: Primary | ICD-10-CM

## 2025-04-25 DIAGNOSIS — M54.50 BILATERAL LOW BACK PAIN, UNSPECIFIED CHRONICITY, UNSPECIFIED WHETHER SCIATICA PRESENT: ICD-10-CM

## 2025-05-15 ENCOUNTER — TREATMENT (OUTPATIENT)
Age: 60
End: 2025-05-15

## 2025-05-15 DIAGNOSIS — M54.50 BILATERAL LOW BACK PAIN, UNSPECIFIED CHRONICITY, UNSPECIFIED WHETHER SCIATICA PRESENT: ICD-10-CM

## 2025-05-15 DIAGNOSIS — R10.84 ABDOMINAL PAIN, GENERALIZED: Primary | ICD-10-CM

## 2025-05-15 PROCEDURE — MISCPFT30 PFT THERAPEUTIC MASSAGE - 30 MIN

## 2025-06-06 ENCOUNTER — HOSPITAL ENCOUNTER (OUTPATIENT)
Age: 60
Setting detail: THERAPIES SERIES
Discharge: HOME | End: 2025-06-06
Payer: COMMERCIAL

## 2025-06-06 PROCEDURE — 97530 THERAPEUTIC ACTIVITIES: CPT | Performed by: PHYSICAL THERAPIST

## 2025-06-06 PROCEDURE — 97161 PT EVAL LOW COMPLEX 20 MIN: CPT | Performed by: PHYSICAL THERAPIST

## 2025-06-06 NOTE — CONSULTS
cupping due to possible cancer diagnosis.  Myofascial release to low back inner thighs and pelvic floor as needed, review of urge suppression techniques and double voiding    Patient's goal is less pain and increased ease of bladder emptying      PT Recommendations:  Swapnil De La Garza will be seen for therapy as described at the following frequency and duration:  1 Visit per week for 6 Weeks     6/6/25 10:15 AM EDT review of systems completed   Plan of care reviewed with PTA    If you have any questions or concerns, please don't hesitate to call.  Thank you for your referral.      Electronically signed by: Tigist Mayorga PT          Physician Signature:______________________________________ DATE:_______________    By signing above or cosigning this note, I have reviewed this plan of care and certify a need for medically necessary rehabilitation services.    ++ PLEASE SIGN ABOVE AND FAX BACK ALL PAGES++

## 2025-06-06 NOTE — FLOWSHEET NOTE
Physical Therapy Treatment Note   [facility] Pelvic Floor Rehabilitation and Therapy  [address]  P: [phone]   F: [phone]     Date:  2025  Patient: Swapnil De La Garza    : 1965    MRN: [id number]  Referring Provider:  Kevin Del Rosario MD    Insurance/Therapy Benefits: Care source Medicaid/auth required  Visit Diagnoses:    Pelvic Pain R10.2,   lumbar pain M54.59,   generalized abdominal pain R10.84  Urgency of urination R39.15    Rehab Codes: Pelvic Pain R10.2,   lumbar pain M54.59,   generalized abdominal pain R10.84  Urgency of urination R39.15   Onset Date: 2025    Next 's appt.: 06/10/25  Visit# / total visits: 1/10;      Cancels/No Shows: 0/0      Subjective:  Swapnil De La Garza  (: 1965  is a 60 y.o.  y.o. male ,. Patient states his low back pain is chronic, but manageable.  Abdominal pain occurs 1-2 times during during the day and more frequently when he is under stress.  The pain is a significant cramp or discomfort, causing him to bend over.  It then also causes urinary frequency and urgency feeling.  He reports no incontinence, however states that he does have difficulty sometimes emptying his bladder fully.  He can also have some burning with urination, however less than once a week.  When he does have the urge to urinate, he feels he needs to rush to a toilet, even if there is little output.  He is now having a biopsy of the prostate for possible prostate cancer later this month.     Pt's concerns are for urinary urgency, lower abdominal pain, low back pain     Swapnil De La Garza states he drinks 40 ounces of water per day      Pain:  [x] Yes  [] No   Location: abdomen, LBP    Pain Rating: (0-10 scale) 3-4/10  Pain altered Tx:  [x] No  [] Yes  Action:    Objective:   Precautions [x] No  [] Yes:   Getting biopsy  for possible prostate CA    Treatment:  Manual Therapy:  23 MIN  MFR to bilateral upper and lower abdomen     Assessment:  Pt had moderate

## 2025-06-12 ENCOUNTER — HOSPITAL ENCOUNTER (OUTPATIENT)
Age: 60
Setting detail: THERAPIES SERIES
Discharge: HOME | End: 2025-06-12
Payer: COMMERCIAL

## 2025-06-12 PROCEDURE — 97530 THERAPEUTIC ACTIVITIES: CPT

## 2025-06-12 PROCEDURE — 97140 MANUAL THERAPY 1/> REGIONS: CPT

## 2025-06-12 NOTE — FLOWSHEET NOTE
noted after treatment.  Pt will continue to benefit from skilled PT to increase mobility decrease frequency and pain     GOALS: To be met in 6 visits  Functional Goals  Progress   Increased ease of urination with more of feeling of complete bladder emptying by 80%  not met   Decrease pain in abdomen to 2 out of 10 at most in a 24-hour period  not met   Independent with pelvic floor and hip stretches to increase range of motion and allow for more full emptying of bladder  not met             Patient education:  [x] Yes  [] No  [] Reviewed Prior HEP/Ed  Pt given home program education re: increase fluids after MFR  Method of Education: [x] Verbal  [] Demo  [] Written  Comprehension of Education:  [] Verbalizes understanding.  [] Demonstrates understanding.  [] Needs review.  [x] Demonstrates/verbalizes HEP/Ed previously given.    Plan:  -myofascial release techniques to abdomen.  No cupping due to possible cancer diagnosis.  Myofascial release to low back inner thighs and pelvic floor as needed, review of urge suppression techniques and double voiding     Patient's goal is less pain and increased ease of bladder emptying    Treatment Charges: Mins Units Time in/out (workers comp and [place]/[employer])   []  Modalities      []  Ther Exercise      [x]  Manual Therapy 45 3    []  Ther Activities      []  Neuro-amy      []  Other      Total Treatment time 45 3          Time In:10:00am            Time Out: 10:45am    Electronically signed by Ruth Kaur PTA on 6/12/2025 at 7:33 AM

## 2025-06-17 ENCOUNTER — HOSPITAL ENCOUNTER (OUTPATIENT)
Age: 60
Setting detail: THERAPIES SERIES
Discharge: HOME | End: 2025-06-17
Payer: COMMERCIAL

## 2025-06-17 PROCEDURE — 97140 MANUAL THERAPY 1/> REGIONS: CPT

## 2025-06-17 NOTE — FLOWSHEET NOTE
Physical Therapy Treatment Note   [facility] Pelvic Floor Rehabilitation and Therapy  [address]  P: [phone]   F: [phone]     Date:  2025  Patient: Swapnil De La Garza    : 1965    MRN: [id number]  Referring Provider:  Kevin Del Rosario MD    Insurance/Therapy Benefits: Care source Medicaid/auth required approved for 12 visits  Visit Diagnoses:    Pelvic Pain R10.2,   lumbar pain M54.59,   generalized abdominal pain R10.84  Urgency of urination R39.15    Rehab Codes: Pelvic Pain R10.2,   lumbar pain M54.59,   generalized abdominal pain R10.84  Urgency of urination R39.15   Onset Date: 2025    Next 's appt.: 06/10/25  Visit# / total visits: 3/12;      Cancels/No Shows: 0/0      Subjective:  Swapnil De La Garza  (: 1965  is a 60 y.o.  y.o. male ,. Patient states his abdominal pain is elevated a little with tightness re-occurring.    Which he is a little stressed as he will be having a biopsy on his prostate .  It then also causes urinary frequency and urgency feeling.  He can also have some burning with urination, however less than once a week.            Pain:  [x] Yes  [] No   Location: abdomen,    Pain Rating: (0-10 scale) 3-4/10  Pain altered Tx:  [x] No  [] Yes  Action:    Objective:   Precautions [x] No  [] Yes:   Getting biopsy  for possible prostate CA    Treatment:  Manual Therapy:  23 MIN  MFR to bilateral upper and lower abdomen       Assessment:  Pt had moderate tightness noted across bilateral upper and lower abdomen decreased after MFR.  No cupping this date due to awaiting biopsy  regarding possible prostate CA.  Pt tolerated treatment well with decrease tightness and pain noted after treatment.  Pt will continue to benefit from skilled PT to increase mobility decrease frequency and pain     GOALS: To be met in 6 visits  Functional Goals  Progress   Increased ease of urination with more of feeling of complete bladder emptying by 80%  not

## 2025-07-11 ENCOUNTER — HOSPITAL ENCOUNTER (OUTPATIENT)
Age: 60
Setting detail: THERAPIES SERIES
Discharge: HOME | End: 2025-07-11
Payer: COMMERCIAL

## 2025-07-11 PROCEDURE — 97140 MANUAL THERAPY 1/> REGIONS: CPT

## 2025-07-11 PROCEDURE — 97530 THERAPEUTIC ACTIVITIES: CPT

## 2025-07-11 NOTE — FLOWSHEET NOTE
Physical Therapy Treatment Note   [facility] Pelvic Floor Rehabilitation and Therapy  [address]  P: [phone]   F: [phone]     Date:  2025  Patient: Swapnil De La Garza    : 1965    MRN: [id number]  Referring Provider:  Kevin Del Rosario MD    Insurance/Therapy Benefits: Care source Medicaid/auth required approved for 12 visits  Visit Diagnoses:    Pelvic Pain R10.2,   lumbar pain M54.59,   generalized abdominal pain R10.84  Urgency of urination R39.15    Rehab Codes: Pelvic Pain R10.2,   lumbar pain M54.59,   generalized abdominal pain R10.84  Urgency of urination R39.15   Onset Date: 2025    Next 's appt.: 06/10/25  Visit# / total visits: ;      Cancels/No Shows: 0/0      Subjective:  Swapnil De La Garza  (: 1965  is a 60 y.o.  y.o. male ,. Patient states his abdominal pain is elevated a little with tightness re-occurring.   Biopsy is done and pt does  have cancer slow growing will see the dr again at end of July to decide on radiation or removal.  Burning with urination is better        Pain:  [x] Yes  [] No   Location: abdomen,    Pain Rating: (0-10 scale) 3-4/10  Pain altered Tx:  [x] No  [] Yes  Action:    Objective:   Precautions [x] No  [] Yes:   Pt has prostate cancer will be setting up surgery for removal at the end of July when he returns to the dr    Treatment:  Manual Therapy:  30 MIN  MFR to bilateral upper and lower abdomen   Theract 15min  reviewed POC, and educated pt on after care of surgery      Assessment:  Pt had moderate tightness noted across bilateral upper and lower abdomen decreased after MFR.  No cupping this date due to  prostate CA.  Pt tolerated treatment well with decrease tightness and pain noted after treatment.  Pt will continue to benefit from skilled PT to increase mobility decrease frequency and pain     GOALS: To be met in 6 visits  Functional Goals  Progress   Increased ease of urination with more of feeling of complete

## 2025-07-24 ENCOUNTER — HOSPITAL ENCOUNTER (OUTPATIENT)
Age: 60
Setting detail: THERAPIES SERIES
Discharge: HOME | End: 2025-07-24
Payer: COMMERCIAL

## 2025-07-24 PROCEDURE — 97140 MANUAL THERAPY 1/> REGIONS: CPT

## 2025-07-24 PROCEDURE — 97530 THERAPEUTIC ACTIVITIES: CPT

## 2025-08-01 ENCOUNTER — HOSPITAL ENCOUNTER (OUTPATIENT)
Age: 60
Setting detail: THERAPIES SERIES
Discharge: HOME | End: 2025-08-01
Payer: COMMERCIAL

## 2025-08-01 PROCEDURE — 97140 MANUAL THERAPY 1/> REGIONS: CPT

## 2025-08-01 NOTE — FLOWSHEET NOTE
EBONI Mayorga        Physician Signature:________________________________Date:__________________  By signing above or cosigning this note, I have reviewed this plan of care and certify a need for medically necessary rehabilitation services.     *PLEASE SIGN ABOVE AND FAX BACK ALL PAGES*        Time In:9:00am            Time Out: 9:30am    Electronically signed by Ruth Kaur PTA on 8/1/2025 at 7:09 AM

## 2025-08-14 ENCOUNTER — HOSPITAL ENCOUNTER (OUTPATIENT)
Age: 60
Setting detail: THERAPIES SERIES
Discharge: HOME | End: 2025-08-14
Payer: COMMERCIAL

## 2025-08-14 PROCEDURE — 97140 MANUAL THERAPY 1/> REGIONS: CPT

## 2025-08-15 ENCOUNTER — APPOINTMENT (OUTPATIENT)
Age: 60
End: 2025-08-15
Payer: COMMERCIAL

## 2025-08-27 ENCOUNTER — HOSPITAL ENCOUNTER (OUTPATIENT)
Age: 60
Setting detail: THERAPIES SERIES
Discharge: HOME | End: 2025-08-27
Payer: COMMERCIAL

## 2025-08-27 PROCEDURE — 97140 MANUAL THERAPY 1/> REGIONS: CPT
